# Patient Record
Sex: MALE | Race: WHITE | HISPANIC OR LATINO | Employment: FULL TIME | URBAN - METROPOLITAN AREA
[De-identification: names, ages, dates, MRNs, and addresses within clinical notes are randomized per-mention and may not be internally consistent; named-entity substitution may affect disease eponyms.]

---

## 2024-04-09 ENCOUNTER — TELEPHONE (OUTPATIENT)
Dept: URGENT CARE | Facility: CLINIC | Age: 39
End: 2024-04-09
Payer: OTHER MISCELLANEOUS

## 2024-04-09 ENCOUNTER — OFFICE VISIT (OUTPATIENT)
Dept: URGENT CARE | Facility: CLINIC | Age: 39
End: 2024-04-09
Payer: OTHER MISCELLANEOUS

## 2024-04-09 VITALS
TEMPERATURE: 98 F | RESPIRATION RATE: 18 BRPM | HEART RATE: 66 BPM | SYSTOLIC BLOOD PRESSURE: 127 MMHG | OXYGEN SATURATION: 97 % | DIASTOLIC BLOOD PRESSURE: 77 MMHG

## 2024-04-09 DIAGNOSIS — S05.91XA RIGHT EYE INJURY, INITIAL ENCOUNTER: ICD-10-CM

## 2024-04-09 DIAGNOSIS — Z02.6 ENCOUNTER RELATED TO WORKER'S COMPENSATION CLAIM: Primary | ICD-10-CM

## 2024-04-09 DIAGNOSIS — S05.01XA ABRASION OF RIGHT CORNEA, INITIAL ENCOUNTER: ICD-10-CM

## 2024-04-09 LAB
CTP QC/QA: YES
POC 5 PANEL DRUG SCREEN: NEGATIVE

## 2024-04-09 PROCEDURE — 80305 DRUG TEST PRSMV DIR OPT OBS: CPT | Mod: S$GLB,,, | Performed by: NURSE PRACTITIONER

## 2024-04-09 PROCEDURE — 99204 OFFICE O/P NEW MOD 45 MIN: CPT | Mod: S$GLB,,, | Performed by: NURSE PRACTITIONER

## 2024-04-09 RX ORDER — FLUORESCEIN SODIUM AND BENOXINATE HYDROCHLORIDE 2.6; 4.4 MG/ML; MG/ML
1 SOLUTION/ DROPS OPHTHALMIC
Status: COMPLETED | OUTPATIENT
Start: 2024-04-09 | End: 2024-04-09

## 2024-04-09 RX ORDER — PREDNISONE 20 MG/1
20 TABLET ORAL DAILY
Qty: 5 TABLET | Refills: 0 | Status: SHIPPED | OUTPATIENT
Start: 2024-04-09 | End: 2024-04-09 | Stop reason: CLARIF

## 2024-04-09 RX ORDER — PROMETHAZINE HYDROCHLORIDE AND DEXTROMETHORPHAN HYDROBROMIDE 6.25; 15 MG/5ML; MG/5ML
5 SYRUP ORAL EVERY 6 HOURS PRN
Qty: 120 ML | Refills: 0 | Status: SHIPPED | OUTPATIENT
Start: 2024-04-09 | End: 2024-04-09 | Stop reason: CLARIF

## 2024-04-09 RX ORDER — ERYTHROMYCIN 5 MG/G
OINTMENT OPHTHALMIC
Qty: 3.5 G | Refills: 0 | Status: SHIPPED | OUTPATIENT
Start: 2024-04-09

## 2024-04-09 RX ADMIN — FLUORESCEIN SODIUM AND BENOXINATE HYDROCHLORIDE 1 DROP: 2.6; 4.4 SOLUTION/ DROPS OPHTHALMIC at 04:04

## 2024-04-09 NOTE — LETTER
[facility]  [address], [address]  [place] [address]  Phone: [phone]  [facility]: [phone]    Pt Name: Samy Lopez  Injury Date: 04/08/2024   Employee ID:  Date of First Treatment: 04/09/2024   Company: [email or web address]      Appointment Time: 01:50 PM Arrived: 2.05pm   Provider: URIAH Allison Time Out:3:40 pm     Office Treatment:   1. Encounter related to worker's compensation claim    2. Right eye injury, initial encounter    3. Abrasion of right cornea, initial encounter      Medications Ordered This Encounter   Medications    erythromycin (ROMYCIN) ophthalmic ointment    fluorescein-benoxinate 0.25-0.4% ophthalmic solution 1 drop                 Return Appointment: Visit date   PRN as needed.    Release to work immediately       
3-5x/week

## 2024-04-09 NOTE — PROGRESS NOTES
Rescheduled pt's appt. Pt verbalized understanding.   Subjective:      Patient ID: Samy Lopez is a 38 y.o. male.    Chief Complaint: Eye Injury    ..Patient's place of employment - [employer]  Patient's job title - [employer]   Date of injury - 04/08/2024  Body part injured including left or right - Right Eye  Injury Mechanism -   What they were doing when they got hurt - Samy states that he was cleaning under a bridge, patient states that he was unscrewing a screw under the bridge and the wind blew and he felt something going into his eyes possibly glass.  What they did immediately after - Samy states at his job there is a kit with water and an employee helped samy pour water inter his eye, Samy states he eye felt a little better but it still feel like something is in his eye, left eye in fine  Pain scale right now - 6    Eye Injury   The right eye is affected. This is a new problem. The current episode started yesterday. The problem occurs constantly. The problem has been unchanged. The injury mechanism was a foreign body. The pain is at a severity of 6/10. The pain is mild. There is No known exposure to pink eye. He Does not wear contacts. Associated symptoms include blurred vision, an eye discharge, double vision and eye redness. Pertinent negatives include no fever, foreign body sensation, itching, nausea, photophobia, recent URI or vomiting. He has tried water for the symptoms. The treatment provided mild relief.       Constitution: Negative for fever.   Eyes:  Positive for eye discharge, eye redness, double vision and blurred vision. Negative for eye itching and photophobia.   Gastrointestinal:  Negative for nausea and vomiting.     Objective:     Physical Exam  Vitals and nursing note reviewed.   HENT:      Head: Normocephalic and atraumatic.   Eyes:      Extraocular Movements: Extraocular movements intact.      Pupils: Pupils are equal, round, and reactive to light.      Comments: Right conjunctiva erythema.   Eyes:  Visual Acuity: Right eye 20/20 Left eye 20/25.   External: Lids are normal without edema or erythema. edema. Normal sclera. No drainage. No proptosis.   EOM: Normal. Conjugate gaze.   Pupils: PERRL. No photophobia.  Visual fields are intact.  Fluorescein Uptake: Fluorescein drops were  used. Right fluorescein uptake at 1 o'clock      Cardiovascular:      Rate and Rhythm: Normal rate.   Pulmonary:      Effort: Pulmonary effort is normal. No respiratory distress.   Neurological:      Mental Status: He is alert and oriented to person, place, and time.   Psychiatric:         Behavior: Behavior normal.      Assessment:      1. Encounter related to worker's compensation claim    2. Right eye injury, initial encounter    3. Abrasion of right cornea, initial encounter      Plan:       Medications Ordered This Encounter   Medications    erythromycin (ROMYCIN) ophthalmic ointment     Sig: Apply 0.5 inch in affected eye/eyelid four times a day for 7 days     Dispense:  3.5 g     Refill:  0    fluorescein-benoxinate 0.25-0.4% ophthalmic solution 1 drop            No follow-ups on file.    Interpretor used for encounter

## 2024-04-09 NOTE — TELEPHONE ENCOUNTER
Called the patient in reference to the referral placed to Occupational Health and the patient does not have a voicemail box setup to leave a message on. RAMON

## 2024-04-17 ENCOUNTER — TELEPHONE (OUTPATIENT)
Dept: URGENT CARE | Facility: CLINIC | Age: 39
End: 2024-04-17
Payer: OTHER MISCELLANEOUS

## 2024-04-17 NOTE — TELEPHONE ENCOUNTER
2nd Attempt, called the patient in reference to the Occupational Health Referral and there was no answer and no voice mailbox setup. AFG